# Patient Record
Sex: FEMALE | Race: WHITE | NOT HISPANIC OR LATINO | URBAN - METROPOLITAN AREA
[De-identification: names, ages, dates, MRNs, and addresses within clinical notes are randomized per-mention and may not be internally consistent; named-entity substitution may affect disease eponyms.]

---

## 2021-02-22 ENCOUNTER — OFFICE VISIT (OUTPATIENT)
Dept: URBAN - METROPOLITAN AREA CLINIC 54 | Facility: CLINIC | Age: 56
End: 2021-02-22

## 2021-02-22 RX ORDER — OMEPRAZOLE 20 MG/1
TAKE 1 CAPSULE (20 MG) BY ORAL ROUTE ONCE DAILY BEFORE A MEAL CAPSULE, DELAYED RELEASE ORAL 1
Qty: 0 | Refills: 0 | COMMUNITY
Start: 1900-01-01

## 2021-02-22 RX ORDER — ESTROGENS, CONJUGATED 0.3 MG/1
TAKE 1 TABLET (0.3 MG) BY ORAL ROUTE ONCE DAILY TABLET, FILM COATED ORAL 1
Qty: 0 | Refills: 0 | COMMUNITY
Start: 1900-01-01

## 2021-02-22 RX ORDER — TIZANIDINE HYDROCHLORIDE 4 MG/1
TAKE 1 CAPSULE (4 MG) BY ORAL ROUTE 3 TIMES PER DAY CAPSULE ORAL
Qty: 0 | Refills: 0 | COMMUNITY
Start: 1900-01-01

## 2021-02-22 RX ORDER — PROMETHAZINE HYDROCHLORIDE 25 MG/1
TAKE 1 TABLET (25 MG) BY ORAL ROUTE ONCE DAILY AT BEDTIME TABLET ORAL 1
Qty: 0 | Refills: 0 | COMMUNITY
Start: 1900-01-01

## 2021-02-22 RX ORDER — LEVOTHYROXINE SODIUM 0.1 MG/1
TAKE 1 TABLET (100 MCG) BY ORAL ROUTE ONCE DAILY TABLET ORAL 1
Qty: 0 | Refills: 0 | COMMUNITY
Start: 1900-01-01

## 2021-04-19 ENCOUNTER — OFFICE VISIT (OUTPATIENT)
Dept: URBAN - METROPOLITAN AREA CLINIC 54 | Facility: CLINIC | Age: 56
End: 2021-04-19

## 2021-05-17 ENCOUNTER — OFFICE VISIT (OUTPATIENT)
Dept: URBAN - METROPOLITAN AREA CLINIC 54 | Facility: CLINIC | Age: 56
End: 2021-05-17
Payer: COMMERCIAL

## 2021-05-17 ENCOUNTER — WEB ENCOUNTER (OUTPATIENT)
Dept: URBAN - METROPOLITAN AREA CLINIC 54 | Facility: CLINIC | Age: 56
End: 2021-05-17

## 2021-05-17 DIAGNOSIS — K21.9 GERD WITHOUT ESOPHAGITIS: ICD-10-CM

## 2021-05-17 DIAGNOSIS — R13.10 ESOPHAGEAL DYSPHAGIA: ICD-10-CM

## 2021-05-17 DIAGNOSIS — E13.9 DIABETES 1.5, MANAGED AS TYPE 2: ICD-10-CM

## 2021-05-17 DIAGNOSIS — E11.610: ICD-10-CM

## 2021-05-17 PROCEDURE — 99214 OFFICE O/P EST MOD 30 MIN: CPT | Performed by: INTERNAL MEDICINE

## 2021-05-17 RX ORDER — PROMETHAZINE HYDROCHLORIDE 25 MG/1
TAKE 1 TABLET (25 MG) BY ORAL ROUTE ONCE DAILY AT BEDTIME TABLET ORAL 1
Qty: 0 | Refills: 0 | Status: ACTIVE | COMMUNITY
Start: 1900-01-01

## 2021-05-17 RX ORDER — TIZANIDINE HYDROCHLORIDE 4 MG/1
TAKE 1 CAPSULE (4 MG) BY ORAL ROUTE 3 TIMES PER DAY CAPSULE ORAL
Qty: 0 | Refills: 0 | Status: ACTIVE | COMMUNITY
Start: 1900-01-01

## 2021-05-17 RX ORDER — LEVOTHYROXINE SODIUM 0.1 MG/1
TAKE 1 TABLET (100 MCG) BY ORAL ROUTE ONCE DAILY TABLET ORAL 1
Qty: 0 | Refills: 0 | Status: ACTIVE | COMMUNITY
Start: 1900-01-01

## 2021-05-17 RX ORDER — ESTROGENS, CONJUGATED 0.3 MG/1
TAKE 1 TABLET (0.3 MG) BY ORAL ROUTE ONCE DAILY TABLET, FILM COATED ORAL 1
Qty: 0 | Refills: 0 | Status: ACTIVE | COMMUNITY
Start: 1900-01-01

## 2021-05-17 RX ORDER — OMEPRAZOLE 20 MG/1
TAKE 1 CAPSULE (20 MG) BY ORAL ROUTE ONCE DAILY BEFORE A MEAL CAPSULE, DELAYED RELEASE ORAL 1
Qty: 0 | Refills: 0 | Status: ACTIVE | COMMUNITY
Start: 1900-01-01

## 2021-05-17 NOTE — HPI-TODAY'S VISIT:
patient broke leg in feb syncope d/t  Pt reports that she is having trouble with dysphagia to medicine and any solids.  Pt reports that she was scheduled for upper endoscopy.  Pt reports that she has acid reflux and taking multiple medication.  Pt reports having DM,  .  Pt reports that she does not have n/v. +early satiety

## 2021-05-30 PROBLEM — 40890009: Status: ACTIVE | Noted: 2021-05-17

## 2021-06-25 ENCOUNTER — OFFICE VISIT (OUTPATIENT)
Dept: URBAN - METROPOLITAN AREA SURGERY CENTER 14 | Facility: SURGERY CENTER | Age: 56
End: 2021-06-25
Payer: COMMERCIAL

## 2021-06-25 DIAGNOSIS — K21.00 ALKALINE REFLUX ESOPHAGITIS: ICD-10-CM

## 2021-06-25 DIAGNOSIS — R13.19 CERVICAL DYSPHAGIA: ICD-10-CM

## 2021-06-25 DIAGNOSIS — K31.89 ACQUIRED DEFORMITY OF DUODENUM: ICD-10-CM

## 2021-06-25 DIAGNOSIS — K22.8 COLUMNAR-LINED ESOPHAGUS: ICD-10-CM

## 2021-06-25 PROCEDURE — 43239 EGD BIOPSY SINGLE/MULTIPLE: CPT | Performed by: INTERNAL MEDICINE

## 2021-06-25 PROCEDURE — 43450 DILATE ESOPHAGUS 1/MULT PASS: CPT | Performed by: INTERNAL MEDICINE

## 2021-06-25 PROCEDURE — G8907 PT DOC NO EVENTS ON DISCHARG: HCPCS | Performed by: INTERNAL MEDICINE

## 2021-07-15 ENCOUNTER — OFFICE VISIT (OUTPATIENT)
Dept: URBAN - METROPOLITAN AREA CLINIC 54 | Facility: CLINIC | Age: 56
End: 2021-07-15

## 2021-07-15 RX ORDER — PROMETHAZINE HYDROCHLORIDE 25 MG/1
TAKE 1 TABLET (25 MG) BY ORAL ROUTE ONCE DAILY AT BEDTIME TABLET ORAL 1
Qty: 0 | Refills: 0 | COMMUNITY

## 2021-07-15 RX ORDER — ESTROGENS, CONJUGATED 0.3 MG/1
TAKE 1 TABLET (0.3 MG) BY ORAL ROUTE ONCE DAILY TABLET, FILM COATED ORAL 1
Qty: 0 | Refills: 0 | COMMUNITY

## 2021-07-15 RX ORDER — TIZANIDINE HYDROCHLORIDE 4 MG/1
TAKE 1 CAPSULE (4 MG) BY ORAL ROUTE 3 TIMES PER DAY CAPSULE ORAL
Qty: 0 | Refills: 0 | COMMUNITY

## 2021-07-15 RX ORDER — LEVOTHYROXINE SODIUM 0.1 MG/1
TAKE 1 TABLET (100 MCG) BY ORAL ROUTE ONCE DAILY TABLET ORAL 1
Qty: 0 | Refills: 0 | COMMUNITY

## 2021-07-15 RX ORDER — OMEPRAZOLE 20 MG/1
TAKE 1 CAPSULE (20 MG) BY ORAL ROUTE ONCE DAILY BEFORE A MEAL CAPSULE, DELAYED RELEASE ORAL 1
Qty: 0 | Refills: 0 | COMMUNITY

## 2021-09-01 ENCOUNTER — OFFICE VISIT (OUTPATIENT)
Dept: URBAN - NONMETROPOLITAN AREA CLINIC 4 | Facility: CLINIC | Age: 56
End: 2021-09-01

## 2021-12-27 ENCOUNTER — TELEPHONE ENCOUNTER (OUTPATIENT)
Dept: URBAN - METROPOLITAN AREA CLINIC 92 | Facility: CLINIC | Age: 56
End: 2021-12-27

## 2021-12-27 ENCOUNTER — OFFICE VISIT (OUTPATIENT)
Dept: URBAN - METROPOLITAN AREA TELEHEALTH 2 | Facility: TELEHEALTH | Age: 56
End: 2021-12-27
Payer: COMMERCIAL

## 2021-12-27 ENCOUNTER — OFFICE VISIT (OUTPATIENT)
Dept: URBAN - METROPOLITAN AREA CLINIC 54 | Facility: CLINIC | Age: 56
End: 2021-12-27

## 2021-12-27 DIAGNOSIS — R13.19 ESOPHAGEAL DYSPHAGIA: ICD-10-CM

## 2021-12-27 DIAGNOSIS — E11.610: ICD-10-CM

## 2021-12-27 DIAGNOSIS — E13.9 DIABETES 1.5, MANAGED AS TYPE 2: ICD-10-CM

## 2021-12-27 DIAGNOSIS — K21.9 GERD WITHOUT ESOPHAGITIS: ICD-10-CM

## 2021-12-27 PROBLEM — 266435005: Status: ACTIVE | Noted: 2021-05-17

## 2021-12-27 PROBLEM — 426875007: Status: ACTIVE | Noted: 2021-05-17

## 2021-12-27 PROCEDURE — 99443 PHONE E/M BY PHYS 21-30 MIN: CPT | Performed by: INTERNAL MEDICINE

## 2021-12-27 RX ORDER — LEVOTHYROXINE SODIUM 0.1 MG/1
TAKE 1 TABLET (100 MCG) BY ORAL ROUTE ONCE DAILY TABLET ORAL 1
Qty: 0 | Refills: 0 | Status: ACTIVE | COMMUNITY

## 2021-12-27 RX ORDER — PROMETHAZINE HYDROCHLORIDE 25 MG/1
TAKE 1 TABLET (25 MG) BY ORAL ROUTE ONCE DAILY AT BEDTIME TABLET ORAL 1
Qty: 0 | Refills: 0 | COMMUNITY

## 2021-12-27 RX ORDER — TIZANIDINE HYDROCHLORIDE 4 MG/1
TAKE 1 CAPSULE (4 MG) BY ORAL ROUTE 3 TIMES PER DAY CAPSULE ORAL
Qty: 0 | Refills: 0 | Status: ACTIVE | COMMUNITY

## 2021-12-27 RX ORDER — ESTROGENS, CONJUGATED 0.3 MG/1
TAKE 1 TABLET (0.3 MG) BY ORAL ROUTE ONCE DAILY TABLET, FILM COATED ORAL 1
Qty: 0 | Refills: 0 | Status: ACTIVE | COMMUNITY

## 2021-12-27 RX ORDER — OMEPRAZOLE 20 MG/1
TAKE 1 CAPSULE (20 MG) BY ORAL ROUTE ONCE DAILY BEFORE A MEAL CAPSULE, DELAYED RELEASE ORAL 1
Qty: 0 | Refills: 0 | COMMUNITY

## 2021-12-27 RX ORDER — TIZANIDINE HYDROCHLORIDE 4 MG/1
TAKE 1 CAPSULE (4 MG) BY ORAL ROUTE 3 TIMES PER DAY CAPSULE ORAL
Qty: 0 | Refills: 0 | COMMUNITY

## 2021-12-27 RX ORDER — LEVOTHYROXINE SODIUM 0.1 MG/1
TAKE 1 TABLET (100 MCG) BY ORAL ROUTE ONCE DAILY TABLET ORAL 1
Qty: 0 | Refills: 0 | COMMUNITY

## 2021-12-27 RX ORDER — OMEPRAZOLE 20 MG/1
TAKE 1 CAPSULE (20 MG) BY ORAL ROUTE ONCE DAILY BEFORE A MEAL CAPSULE, DELAYED RELEASE ORAL 1
Qty: 0 | Refills: 0 | Status: ACTIVE | COMMUNITY

## 2021-12-27 RX ORDER — PROMETHAZINE HYDROCHLORIDE 25 MG/1
TAKE 1 TABLET (25 MG) BY ORAL ROUTE ONCE DAILY AT BEDTIME TABLET ORAL 1
Qty: 0 | Refills: 0 | Status: ACTIVE | COMMUNITY

## 2021-12-27 RX ORDER — ESTROGENS, CONJUGATED 0.3 MG/1
TAKE 1 TABLET (0.3 MG) BY ORAL ROUTE ONCE DAILY TABLET, FILM COATED ORAL 1
Qty: 0 | Refills: 0 | COMMUNITY

## 2021-12-27 NOTE — HPI-TODAY'S VISIT:
patient broke leg in feb syncope d/t  Pt reports that she is having trouble with dysphagia to medicine and any solids.  Pt reports that she was scheduled for upper endoscopy.  Pt reports that she has acid reflux and taking multiple medication.  Pt reports having DM,  .  Pt reports that she does not have n/v. +early satiety  12-27-21 Still w dysphagia despite dilation to 50F. Early satiety has improved. DM has been better controlled. A1c came down 1 point better control.Still on opiates. Pt reports that she was having a chest tightness as well referred by Dr. JOSE Monzon.

## 2022-02-28 ENCOUNTER — OFFICE VISIT (OUTPATIENT)
Dept: URBAN - METROPOLITAN AREA CLINIC 54 | Facility: CLINIC | Age: 57
End: 2022-02-28

## 2022-02-28 RX ORDER — TIZANIDINE HYDROCHLORIDE 4 MG/1
TAKE 1 CAPSULE (4 MG) BY ORAL ROUTE 3 TIMES PER DAY CAPSULE ORAL
Qty: 0 | Refills: 0 | Status: ACTIVE | COMMUNITY

## 2022-02-28 RX ORDER — ESTROGENS, CONJUGATED 0.3 MG/1
TAKE 1 TABLET (0.3 MG) BY ORAL ROUTE ONCE DAILY TABLET, FILM COATED ORAL 1
Qty: 0 | Refills: 0 | Status: ACTIVE | COMMUNITY

## 2022-02-28 RX ORDER — OMEPRAZOLE 20 MG/1
TAKE 1 CAPSULE (20 MG) BY ORAL ROUTE ONCE DAILY BEFORE A MEAL CAPSULE, DELAYED RELEASE ORAL 1
Qty: 0 | Refills: 0 | Status: ACTIVE | COMMUNITY

## 2022-02-28 RX ORDER — PROMETHAZINE HYDROCHLORIDE 25 MG/1
TAKE 1 TABLET (25 MG) BY ORAL ROUTE ONCE DAILY AT BEDTIME TABLET ORAL 1
Qty: 0 | Refills: 0 | Status: ACTIVE | COMMUNITY

## 2022-02-28 RX ORDER — LEVOTHYROXINE SODIUM 0.1 MG/1
TAKE 1 TABLET (100 MCG) BY ORAL ROUTE ONCE DAILY TABLET ORAL 1
Qty: 0 | Refills: 0 | Status: ACTIVE | COMMUNITY

## 2023-06-12 ENCOUNTER — WEB ENCOUNTER (OUTPATIENT)
Dept: URBAN - NONMETROPOLITAN AREA CLINIC 4 | Facility: CLINIC | Age: 58
End: 2023-06-12

## 2023-06-12 ENCOUNTER — OFFICE VISIT (OUTPATIENT)
Dept: URBAN - NONMETROPOLITAN AREA CLINIC 4 | Facility: CLINIC | Age: 58
End: 2023-06-12
Payer: COMMERCIAL

## 2023-06-12 VITALS
HEART RATE: 68 BPM | HEIGHT: 66 IN | WEIGHT: 262 LBS | SYSTOLIC BLOOD PRESSURE: 113 MMHG | DIASTOLIC BLOOD PRESSURE: 77 MMHG | TEMPERATURE: 97.5 F | BODY MASS INDEX: 42.11 KG/M2

## 2023-06-12 DIAGNOSIS — Z12.11 COLON CANCER SCREENING: ICD-10-CM

## 2023-06-12 DIAGNOSIS — R13.19 CERVICAL DYSPHAGIA: ICD-10-CM

## 2023-06-12 DIAGNOSIS — K21.9 GERD: ICD-10-CM

## 2023-06-12 PROCEDURE — 99214 OFFICE O/P EST MOD 30 MIN: CPT | Performed by: INTERNAL MEDICINE

## 2023-06-12 RX ORDER — LEVOTHYROXINE SODIUM 0.1 MG/1
TAKE 1 TABLET (100 MCG) BY ORAL ROUTE ONCE DAILY TABLET ORAL 1
Qty: 0 | Refills: 0 | Status: ACTIVE | COMMUNITY

## 2023-06-12 RX ORDER — ESTROGENS, CONJUGATED 0.3 MG/1
TAKE 1 TABLET (0.3 MG) BY ORAL ROUTE ONCE DAILY TABLET, FILM COATED ORAL 1
Qty: 0 | Refills: 0 | Status: ACTIVE | COMMUNITY

## 2023-06-12 RX ORDER — PROMETHAZINE HYDROCHLORIDE 25 MG/1
TAKE 1 TABLET (25 MG) BY ORAL ROUTE ONCE DAILY AT BEDTIME TABLET ORAL 1
Qty: 0 | Refills: 0 | Status: ACTIVE | COMMUNITY

## 2023-06-12 RX ORDER — OMEPRAZOLE 20 MG/1
TAKE 1 CAPSULE (20 MG) BY ORAL ROUTE ONCE DAILY BEFORE A MEAL CAPSULE, DELAYED RELEASE ORAL 1
Qty: 0 | Refills: 0 | Status: ACTIVE | COMMUNITY

## 2023-06-12 RX ORDER — TIZANIDINE HYDROCHLORIDE 4 MG/1
TAKE 1 CAPSULE (4 MG) BY ORAL ROUTE 3 TIMES PER DAY CAPSULE ORAL
Qty: 0 | Refills: 0 | Status: ACTIVE | COMMUNITY

## 2023-06-12 NOTE — HPI-TODAY'S VISIT:
patient broke leg in feb syncope d/t  Pt reports that she is having trouble with dysphagia to medicine and any solids.  Pt reports that she was scheduled for upper endoscopy.  Pt reports that she has acid reflux and taking multiple medication.  Pt reports having DM,  .  Pt reports that she does not have n/v. +early satiety  12-27-21 Still w dysphagia despite dilation to 50F. Early satiety has improved. DM has been better controlled. A1c came down 1 point better control.Still on opiates. Pt reports that she was having a chest tightness as well referred by Dr. JOSE Monzon. 6-12-23 Pt returns with c/o ongoing dysphagia. SHe remains on opiates. BS with small hiatal hernia last year no stricture. s/p egd w dilation to 50 w no relief. She also had 2 colon within past decade , first poor prep rp in 2016 with no adenomas. Due for rp in 2025-26. DM uncontrolled complicated by Charcot joint changes with multiple

## 2024-02-26 ENCOUNTER — OV EP (OUTPATIENT)
Dept: URBAN - NONMETROPOLITAN AREA CLINIC 4 | Facility: CLINIC | Age: 59
End: 2024-02-26

## 2024-03-14 ENCOUNTER — OV EP (OUTPATIENT)
Dept: URBAN - NONMETROPOLITAN AREA CLINIC 4 | Facility: CLINIC | Age: 59
End: 2024-03-14
Payer: COMMERCIAL

## 2024-03-14 VITALS
SYSTOLIC BLOOD PRESSURE: 103 MMHG | BODY MASS INDEX: 47.09 KG/M2 | HEIGHT: 66 IN | TEMPERATURE: 98 F | WEIGHT: 293 LBS | HEART RATE: 84 BPM | DIASTOLIC BLOOD PRESSURE: 72 MMHG

## 2024-03-14 DIAGNOSIS — Z12.11 COLON CANCER SCREENING: ICD-10-CM

## 2024-03-14 DIAGNOSIS — E66.01 MORBID (SEVERE) OBESITY DUE TO EXCESS CALORIES: ICD-10-CM

## 2024-03-14 DIAGNOSIS — K21.9 GERD: ICD-10-CM

## 2024-03-14 DIAGNOSIS — R13.10 ESOPHAGEAL DYSPHAGIA: ICD-10-CM

## 2024-03-14 PROBLEM — 83911000119104: Status: ACTIVE | Noted: 2024-03-14

## 2024-03-14 PROBLEM — 408512008: Status: ACTIVE | Noted: 2024-03-14

## 2024-03-14 PROCEDURE — 99214 OFFICE O/P EST MOD 30 MIN: CPT | Performed by: PHYSICIAN ASSISTANT

## 2024-03-14 RX ORDER — EPINEPHRINE 0.3 MG/.3ML
AS DIRECTED INJECTION INTRAMUSCULAR; SUBCUTANEOUS
Status: ACTIVE | COMMUNITY

## 2024-03-14 RX ORDER — LIRAGLUTIDE 6 MG/ML
AS DIRECTED INJECTION SUBCUTANEOUS
Status: ACTIVE | COMMUNITY

## 2024-03-14 RX ORDER — TIZANIDINE HYDROCHLORIDE 4 MG/1
TAKE 1 CAPSULE (4 MG) BY ORAL ROUTE 3 TIMES PER DAY CAPSULE ORAL
Qty: 0 | Refills: 0 | Status: ACTIVE | COMMUNITY

## 2024-03-14 RX ORDER — OMEPRAZOLE 20 MG/1
TAKE 1 CAPSULE (20 MG) BY ORAL ROUTE ONCE DAILY BEFORE A MEAL CAPSULE, DELAYED RELEASE ORAL 1
Qty: 0 | Refills: 0 | Status: ACTIVE | COMMUNITY

## 2024-03-14 RX ORDER — CETIRIZINE HYDROCHLORIDE 10 MG/1
1 TABLET TABLET, FILM COATED ORAL ONCE A DAY
Status: ACTIVE | COMMUNITY

## 2024-03-14 RX ORDER — ESTROGENS, CONJUGATED 0.3 MG/1
TAKE 1 TABLET (0.3 MG) BY ORAL ROUTE ONCE DAILY TABLET, FILM COATED ORAL 1
Qty: 0 | Refills: 0 | Status: ACTIVE | COMMUNITY

## 2024-03-14 RX ORDER — LEVOTHYROXINE SODIUM 0.1 MG/1
TAKE 1 TABLET (100 MCG) BY ORAL ROUTE ONCE DAILY TABLET ORAL 1
Qty: 0 | Refills: 0 | Status: ACTIVE | COMMUNITY

## 2024-03-14 RX ORDER — FAMOTIDINE 20 MG/1
1 TABLET AT BEDTIME AS NEEDED TABLET, FILM COATED ORAL ONCE A DAY
Status: ACTIVE | COMMUNITY

## 2024-03-14 RX ORDER — PROMETHAZINE HYDROCHLORIDE 25 MG/1
TAKE 1 TABLET (25 MG) BY ORAL ROUTE ONCE DAILY AT BEDTIME TABLET ORAL 1
Qty: 0 | Refills: 0 | Status: ACTIVE | COMMUNITY

## 2024-03-14 RX ORDER — MECLIZINE HYDROCHLORIDE 25 MG/1
1 TABLET AS NEEDED TABLET, CHEWABLE ORAL
Status: ACTIVE | COMMUNITY

## 2024-03-14 RX ORDER — GABAPENTIN 100 MG/1
1 CAPSULE CAPSULE ORAL ONCE A DAY
Status: ACTIVE | COMMUNITY

## 2024-03-14 NOTE — HPI-TODAY'S VISIT:
patient broke leg in feb syncope d/t  Pt reports that she is having trouble with dysphagia to medicine and any solids.  Pt reports that she was scheduled for upper endoscopy.  Pt reports that she has acid reflux and taking multiple medication.  Pt reports having DM,  .  Pt reports that she does not have n/v. +early satiety  12-27-21 Still w dysphagia despite dilation to 50F. Early satiety has improved. DM has been better controlled. A1c came down 1 point better control.Still on opiates. Pt reports that she was having a chest tightness as well referred by Dr. JOSE Monzon. 6-12-23 Pt returns with c/o ongoing dysphagia. SHe remains on opiates. BS with small hiatal hernia last year no stricture. s/p egd w dilation to 50 w no relief. She also had 2 colon within past decade , first poor prep rp in 2016 with no adenomas. Due for rp in 2025-26. DM uncontrolled complicated by Charcot joint changes with multiple  3.14.24 Here for repeat cscope as well as repeat EGD  History of dysphagia. Reveiwed last EGD from 2019 with dilation performed to 50- with some improvement for a few months discussed with patient also reviewed esophageal motility noted during EGD  last attempt at cscope in 2016 noted in chart with cecal polyp removed, no adenoma  Denies any lower GI issues, still with intrmittent dysphagia, mostly to pills now- but also with continued GERD  taking Omeprazole daily  Still with DM, obesity

## 2024-05-02 ENCOUNTER — TELEPHONE ENCOUNTER (OUTPATIENT)
Dept: URBAN - NONMETROPOLITAN AREA CLINIC 4 | Facility: CLINIC | Age: 59
End: 2024-05-02